# Patient Record
Sex: FEMALE | Race: WHITE | NOT HISPANIC OR LATINO | URBAN - METROPOLITAN AREA
[De-identification: names, ages, dates, MRNs, and addresses within clinical notes are randomized per-mention and may not be internally consistent; named-entity substitution may affect disease eponyms.]

---

## 2017-12-18 ENCOUNTER — OUTPATIENT (OUTPATIENT)
Dept: OUTPATIENT SERVICES | Facility: HOSPITAL | Age: 42
LOS: 1 days | End: 2017-12-18

## 2018-01-19 ENCOUNTER — OUTPATIENT (OUTPATIENT)
Dept: OUTPATIENT SERVICES | Facility: HOSPITAL | Age: 43
LOS: 1 days | End: 2018-01-19
Payer: COMMERCIAL

## 2018-01-19 PROCEDURE — 76641 ULTRASOUND BREAST COMPLETE: CPT | Mod: 26,50

## 2018-01-19 PROCEDURE — 77067 SCR MAMMO BI INCL CAD: CPT | Mod: 26

## 2018-01-19 PROCEDURE — 77067 SCR MAMMO BI INCL CAD: CPT

## 2018-01-19 PROCEDURE — 76641 ULTRASOUND BREAST COMPLETE: CPT

## 2018-09-15 ENCOUNTER — TRANSCRIPTION ENCOUNTER (OUTPATIENT)
Age: 43
End: 2018-09-15

## 2018-09-16 ENCOUNTER — EMERGENCY (EMERGENCY)
Facility: HOSPITAL | Age: 43
LOS: 1 days | Discharge: HOME | End: 2018-09-16
Attending: EMERGENCY MEDICINE
Payer: COMMERCIAL

## 2018-09-16 VITALS
TEMPERATURE: 98 F | SYSTOLIC BLOOD PRESSURE: 125 MMHG | DIASTOLIC BLOOD PRESSURE: 78 MMHG | HEART RATE: 70 BPM | RESPIRATION RATE: 16 BRPM | OXYGEN SATURATION: 98 %

## 2018-09-16 VITALS
WEIGHT: 125 LBS | SYSTOLIC BLOOD PRESSURE: 135 MMHG | HEART RATE: 65 BPM | RESPIRATION RATE: 16 BRPM | OXYGEN SATURATION: 99 % | DIASTOLIC BLOOD PRESSURE: 83 MMHG | HEIGHT: 65 IN | TEMPERATURE: 98 F

## 2018-09-16 PROCEDURE — 99283 EMERGENCY DEPT VISIT LOW MDM: CPT

## 2018-09-16 RX ORDER — OXYCODONE HYDROCHLORIDE 5 MG/1
5 TABLET ORAL ONCE
Qty: 0 | Refills: 0 | Status: DISCONTINUED | OUTPATIENT
Start: 2018-09-16 | End: 2018-09-16

## 2018-09-16 RX ORDER — IBUPROFEN 200 MG
600 TABLET ORAL ONCE
Qty: 0 | Refills: 0 | Status: COMPLETED | OUTPATIENT
Start: 2018-09-16 | End: 2018-09-16

## 2018-09-16 RX ORDER — LIDOCAINE 4 G/100G
1 CREAM TOPICAL ONCE
Qty: 0 | Refills: 0 | Status: COMPLETED | OUTPATIENT
Start: 2018-09-16 | End: 2018-09-16

## 2018-09-16 RX ORDER — OXYCODONE HYDROCHLORIDE 5 MG/1
1 TABLET ORAL
Qty: 10 | Refills: 0
Start: 2018-09-16

## 2018-09-16 RX ORDER — DIAZEPAM 5 MG
5 TABLET ORAL ONCE
Qty: 0 | Refills: 0 | Status: DISCONTINUED | OUTPATIENT
Start: 2018-09-16 | End: 2018-09-16

## 2018-09-16 RX ORDER — DIAZEPAM 5 MG
1 TABLET ORAL
Qty: 5 | Refills: 0
Start: 2018-09-16

## 2018-09-16 RX ORDER — ACETAMINOPHEN 500 MG
975 TABLET ORAL ONCE
Qty: 0 | Refills: 0 | Status: COMPLETED | OUTPATIENT
Start: 2018-09-16 | End: 2018-09-16

## 2018-09-16 RX ADMIN — Medication 600 MILLIGRAM(S): at 17:20

## 2018-09-16 RX ADMIN — OXYCODONE HYDROCHLORIDE 5 MILLIGRAM(S): 5 TABLET ORAL at 18:32

## 2018-09-16 RX ADMIN — Medication 975 MILLIGRAM(S): at 17:20

## 2018-09-16 RX ADMIN — OXYCODONE HYDROCHLORIDE 5 MILLIGRAM(S): 5 TABLET ORAL at 18:03

## 2018-09-16 RX ADMIN — Medication 5 MILLIGRAM(S): at 16:53

## 2018-09-16 RX ADMIN — Medication 975 MILLIGRAM(S): at 16:52

## 2018-09-16 RX ADMIN — OXYCODONE HYDROCHLORIDE 5 MILLIGRAM(S): 5 TABLET ORAL at 20:03

## 2018-09-16 RX ADMIN — LIDOCAINE 1 PATCH: 4 CREAM TOPICAL at 16:53

## 2018-09-16 RX ADMIN — Medication 600 MILLIGRAM(S): at 16:53

## 2018-09-16 NOTE — ED ADULT NURSE NOTE - OBJECTIVE STATEMENT
43 y/o female seen [facility] c/o low back pain 43 y/o female seen in [facility] c/o low back pain since 3days ago. Pt reports she carried her daughter and next day she woke up with low back pain. Pt's seen at Urgent Care and started on Naproxen but no relief.  Pt also had acupuncture/ cupping treatment yesterday afternoon but no improvement.  Denies fever, chills, headache, neck pain, abd pain, chest pain,  urinary or bowel problems.  Denies sensory changes or weakness to extremities.

## 2018-09-16 NOTE — ED PROVIDER NOTE - OBJECTIVE STATEMENT
43yo female pt, no significant PMHx, ambulatory c/o low back pain since 3days ago. Pt 41yo female pt, no significant PMHx, ambulatory c/o low back pain since 3days ago. Pt stated she carried her daughter and woke up with low back next day. Pt's evaluated by UC and on Napr 43yo female pt, no significant PMHx, ambulatory c/o low back pain since 3days ago. Pt stated she carried her daughter and woke up with low back next day. Pt's evaluated by UC, on Naproxyn without relief,  and 43yo female pt, no significant PMHx, ambulatory c/o low back pain since 3days ago. Pt stated she carried her daughter and woke up with low back next day. Pt's evaluated by UC yesterday and on Naproxyn without relief. She also had acupuncture/ cupping Tx yesterday afternoon but the pain's not improved. Denies fever, chills or congestion/ cough. Denies headache or neck pain. Denies CP/SOB/ABD pain or N/V/D. Denies urinary or bowel problems. Denies sensory changes or weakness to extremities.

## 2018-09-16 NOTE — ED PROVIDER NOTE - ATTENDING CONTRIBUTION TO CARE
Private Physician Lexus CARR (ny,ny/pcp)  42y female pmh Neg, no dm,htn,hld,habits,travel,cancer,mi,cva. Pt comes to ed complains of pain rt back rad to leg/ankle. Onset two days ago. Worsened yesterday. Seen at urgent care and treated with #1 naprosyn today and without improvement. No bowel./bladder changes. worse with moving from standing and sitting/changing positon. No trauma no fever. chills. PE WDWN male awake alert normocephalic atraumatic neck supple cv no rubs, gallops or murmurs, chest clear anterior & posterior abd soft +bs, Neuro +slr rt at 30degress otherwise non focal exam power 5.5 all extr pain light touch intact. No ls ttp.  Speedy Parker MD, Facep

## 2018-09-16 NOTE — ED PROVIDER NOTE - PHYSICAL EXAMINATION
NAD, VSS, Afebrile, No spinal mid tender, + Right para lumbar tender without swelling or lesion, No CVA tender, + Right sciatica tender, Neuro- intact.

## 2021-09-09 ENCOUNTER — FORM ENCOUNTER (OUTPATIENT)
Age: 46
End: 2021-09-09

## 2021-09-10 ENCOUNTER — APPOINTMENT (OUTPATIENT)
Dept: OBGYN | Facility: CLINIC | Age: 46
End: 2021-09-10
Payer: COMMERCIAL

## 2021-09-10 PROCEDURE — 0500F INITIAL PRENATAL CARE VISIT: CPT

## 2021-09-21 ENCOUNTER — APPOINTMENT (OUTPATIENT)
Dept: OBGYN | Facility: CLINIC | Age: 46
End: 2021-09-21
Payer: COMMERCIAL

## 2021-09-21 ENCOUNTER — FORM ENCOUNTER (OUTPATIENT)
Age: 46
End: 2021-09-21

## 2021-09-21 PROCEDURE — 0502F SUBSEQUENT PRENATAL CARE: CPT

## 2021-09-27 ENCOUNTER — FORM ENCOUNTER (OUTPATIENT)
Age: 46
End: 2021-09-27

## 2021-10-01 ENCOUNTER — FORM ENCOUNTER (OUTPATIENT)
Age: 46
End: 2021-10-01

## 2021-10-05 ENCOUNTER — FORM ENCOUNTER (OUTPATIENT)
Age: 46
End: 2021-10-05

## 2021-10-05 ENCOUNTER — APPOINTMENT (OUTPATIENT)
Dept: OBGYN | Facility: CLINIC | Age: 46
End: 2021-10-05
Payer: COMMERCIAL

## 2021-10-05 VITALS — SYSTOLIC BLOOD PRESSURE: 110 MMHG | DIASTOLIC BLOOD PRESSURE: 60 MMHG | WEIGHT: 140 LBS

## 2021-10-05 PROBLEM — Z00.00 ENCOUNTER FOR PREVENTIVE HEALTH EXAMINATION: Status: ACTIVE | Noted: 2021-10-05

## 2021-10-05 PROCEDURE — 0502F SUBSEQUENT PRENATAL CARE: CPT

## 2021-10-05 PROCEDURE — 76816 OB US FOLLOW-UP PER FETUS: CPT

## 2021-10-05 PROCEDURE — 76819 FETAL BIOPHYS PROFIL W/O NST: CPT

## 2021-10-27 ENCOUNTER — NON-APPOINTMENT (OUTPATIENT)
Age: 46
End: 2021-10-27

## 2021-10-27 DIAGNOSIS — Z78.9 OTHER SPECIFIED HEALTH STATUS: ICD-10-CM

## 2021-10-27 RX ORDER — PRENATAL VIT 49/IRON FUM/FOLIC 6.75-0.2MG
TABLET ORAL
Refills: 0 | Status: ACTIVE | COMMUNITY

## 2021-10-29 ENCOUNTER — APPOINTMENT (OUTPATIENT)
Dept: OBGYN | Facility: CLINIC | Age: 46
End: 2021-10-29

## 2021-11-03 ENCOUNTER — FORM ENCOUNTER (OUTPATIENT)
Age: 46
End: 2021-11-03

## 2021-11-04 ENCOUNTER — APPOINTMENT (OUTPATIENT)
Dept: OBGYN | Facility: CLINIC | Age: 46
End: 2021-11-04
Payer: COMMERCIAL

## 2021-11-04 ENCOUNTER — ASOB RESULT (OUTPATIENT)
Age: 46
End: 2021-11-04

## 2021-11-04 PROCEDURE — 0502F SUBSEQUENT PRENATAL CARE: CPT

## 2021-11-04 PROCEDURE — 76816 OB US FOLLOW-UP PER FETUS: CPT

## 2021-11-16 ENCOUNTER — APPOINTMENT (OUTPATIENT)
Dept: OBGYN | Facility: CLINIC | Age: 46
End: 2021-11-16
Payer: COMMERCIAL

## 2021-11-16 ENCOUNTER — ASOB RESULT (OUTPATIENT)
Age: 46
End: 2021-11-16

## 2021-11-16 PROCEDURE — 76818 FETAL BIOPHYS PROFILE W/NST: CPT

## 2021-11-16 PROCEDURE — 0502F SUBSEQUENT PRENATAL CARE: CPT

## 2021-11-18 LAB — B-HEM STREP SPEC QL CULT: NORMAL

## 2021-11-23 ENCOUNTER — APPOINTMENT (OUTPATIENT)
Dept: OBGYN | Facility: CLINIC | Age: 46
End: 2021-11-23
Payer: COMMERCIAL

## 2021-11-23 ENCOUNTER — ASOB RESULT (OUTPATIENT)
Age: 46
End: 2021-11-23

## 2021-11-23 PROCEDURE — 76818 FETAL BIOPHYS PROFILE W/NST: CPT

## 2021-12-01 ENCOUNTER — APPOINTMENT (OUTPATIENT)
Dept: OBGYN | Facility: CLINIC | Age: 46
End: 2021-12-01
Payer: COMMERCIAL

## 2021-12-01 ENCOUNTER — ASOB RESULT (OUTPATIENT)
Age: 46
End: 2021-12-01

## 2021-12-01 PROCEDURE — 76818 FETAL BIOPHYS PROFILE W/NST: CPT

## 2021-12-01 PROCEDURE — 76816 OB US FOLLOW-UP PER FETUS: CPT

## 2021-12-07 ENCOUNTER — APPOINTMENT (OUTPATIENT)
Dept: OBGYN | Facility: CLINIC | Age: 46
End: 2021-12-07
Payer: COMMERCIAL

## 2021-12-07 ENCOUNTER — ASOB RESULT (OUTPATIENT)
Age: 46
End: 2021-12-07

## 2021-12-07 PROCEDURE — 76818 FETAL BIOPHYS PROFILE W/NST: CPT

## 2021-12-07 PROCEDURE — 0502F SUBSEQUENT PRENATAL CARE: CPT

## 2021-12-07 PROCEDURE — 59426 ANTEPARTUM CARE ONLY: CPT

## 2021-12-10 ENCOUNTER — APPOINTMENT (OUTPATIENT)
Dept: OBGYN | Facility: CLINIC | Age: 46
End: 2021-12-10
Payer: COMMERCIAL

## 2021-12-10 PROCEDURE — 59025 FETAL NON-STRESS TEST: CPT

## 2021-12-11 ENCOUNTER — OUTPATIENT (OUTPATIENT)
Dept: OUTPATIENT SERVICES | Facility: HOSPITAL | Age: 46
LOS: 1 days | End: 2021-12-11
Payer: COMMERCIAL

## 2021-12-11 DIAGNOSIS — Z11.52 ENCOUNTER FOR SCREENING FOR COVID-19: ICD-10-CM

## 2021-12-11 LAB — SARS-COV-2 RNA SPEC QL NAA+PROBE: SIGNIFICANT CHANGE UP

## 2021-12-11 PROCEDURE — U0005: CPT

## 2021-12-11 PROCEDURE — C9803: CPT

## 2021-12-11 PROCEDURE — U0003: CPT

## 2021-12-13 ENCOUNTER — INPATIENT (INPATIENT)
Facility: HOSPITAL | Age: 46
LOS: 1 days | Discharge: HOME | End: 2021-12-15
Attending: OBSTETRICS & GYNECOLOGY | Admitting: OBSTETRICS & GYNECOLOGY
Payer: COMMERCIAL

## 2021-12-13 ENCOUNTER — OUTPATIENT (OUTPATIENT)
Dept: OUTPATIENT SERVICES | Facility: HOSPITAL | Age: 46
LOS: 1 days | End: 2021-12-13
Payer: COMMERCIAL

## 2021-12-13 VITALS
SYSTOLIC BLOOD PRESSURE: 113 MMHG | DIASTOLIC BLOOD PRESSURE: 67 MMHG | RESPIRATION RATE: 16 BRPM | HEART RATE: 78 BPM | SYSTOLIC BLOOD PRESSURE: 113 MMHG | HEART RATE: 78 BPM | DIASTOLIC BLOOD PRESSURE: 67 MMHG | TEMPERATURE: 98 F

## 2021-12-13 VITALS — HEART RATE: 79 BPM | OXYGEN SATURATION: 98 %

## 2021-12-13 DIAGNOSIS — Z34.90 ENCOUNTER FOR SUPERVISION OF NORMAL PREGNANCY, UNSPECIFIED, UNSPECIFIED TRIMESTER: ICD-10-CM

## 2021-12-13 DIAGNOSIS — O26.899 OTHER SPECIFIED PREGNANCY RELATED CONDITIONS, UNSPECIFIED TRIMESTER: ICD-10-CM

## 2021-12-13 DIAGNOSIS — Z3A.00 WEEKS OF GESTATION OF PREGNANCY NOT SPECIFIED: ICD-10-CM

## 2021-12-13 DIAGNOSIS — O09.523 SUPERVISION OF ELDERLY MULTIGRAVIDA, THIRD TRIMESTER: ICD-10-CM

## 2021-12-13 LAB
BASOPHILS # BLD AUTO: 0.06 K/UL — SIGNIFICANT CHANGE UP (ref 0–0.2)
BASOPHILS NFR BLD AUTO: 0.8 % — SIGNIFICANT CHANGE UP (ref 0–2)
BLD GP AB SCN SERPL QL: NEGATIVE — SIGNIFICANT CHANGE UP
COVID-19 SPIKE DOMAIN AB INTERP: NEGATIVE — SIGNIFICANT CHANGE UP
COVID-19 SPIKE DOMAIN ANTIBODY RESULT: 0.66 U/ML — SIGNIFICANT CHANGE UP
EOSINOPHIL # BLD AUTO: 0.09 K/UL — SIGNIFICANT CHANGE UP (ref 0–0.5)
EOSINOPHIL NFR BLD AUTO: 1.1 % — SIGNIFICANT CHANGE UP (ref 0–6)
HCT VFR BLD CALC: 33.8 % — LOW (ref 34.5–45)
HGB BLD-MCNC: 11.8 G/DL — SIGNIFICANT CHANGE UP (ref 11.5–15.5)
HIV 1+2 AB+HIV1 P24 AG SERPL QL IA: SIGNIFICANT CHANGE UP
IMM GRANULOCYTES NFR BLD AUTO: 0.3 % — SIGNIFICANT CHANGE UP (ref 0–1.5)
LYMPHOCYTES # BLD AUTO: 2.03 K/UL — SIGNIFICANT CHANGE UP (ref 1–3.3)
LYMPHOCYTES # BLD AUTO: 25.9 % — SIGNIFICANT CHANGE UP (ref 13–44)
MCHC RBC-ENTMCNC: 30.6 PG — SIGNIFICANT CHANGE UP (ref 27–34)
MCHC RBC-ENTMCNC: 34.9 GM/DL — SIGNIFICANT CHANGE UP (ref 32–36)
MCV RBC AUTO: 87.8 FL — SIGNIFICANT CHANGE UP (ref 80–100)
MONOCYTES # BLD AUTO: 0.55 K/UL — SIGNIFICANT CHANGE UP (ref 0–0.9)
MONOCYTES NFR BLD AUTO: 7 % — SIGNIFICANT CHANGE UP (ref 2–14)
NEUTROPHILS # BLD AUTO: 5.08 K/UL — SIGNIFICANT CHANGE UP (ref 1.8–7.4)
NEUTROPHILS NFR BLD AUTO: 64.9 % — SIGNIFICANT CHANGE UP (ref 43–77)
NRBC # BLD: 0 /100 WBCS — SIGNIFICANT CHANGE UP (ref 0–0)
PLATELET # BLD AUTO: 194 K/UL — SIGNIFICANT CHANGE UP (ref 150–400)
RBC # BLD: 3.85 M/UL — SIGNIFICANT CHANGE UP (ref 3.8–5.2)
RBC # FLD: 12.3 % — SIGNIFICANT CHANGE UP (ref 10.3–14.5)
RH IG SCN BLD-IMP: POSITIVE — SIGNIFICANT CHANGE UP
SARS-COV-2 IGG+IGM SERPL QL IA: 0.66 U/ML — SIGNIFICANT CHANGE UP
SARS-COV-2 IGG+IGM SERPL QL IA: NEGATIVE — SIGNIFICANT CHANGE UP
T PALLIDUM AB TITR SER: NEGATIVE — SIGNIFICANT CHANGE UP
WBC # BLD: 7.83 K/UL — SIGNIFICANT CHANGE UP (ref 3.8–10.5)
WBC # FLD AUTO: 7.83 K/UL — SIGNIFICANT CHANGE UP (ref 3.8–10.5)

## 2021-12-13 PROCEDURE — 59410 OBSTETRICAL CARE: CPT

## 2021-12-13 PROCEDURE — 86900 BLOOD TYPING SEROLOGIC ABO: CPT

## 2021-12-13 PROCEDURE — 86901 BLOOD TYPING SEROLOGIC RH(D): CPT

## 2021-12-13 PROCEDURE — G0463: CPT

## 2021-12-13 PROCEDURE — 86850 RBC ANTIBODY SCREEN: CPT

## 2021-12-13 PROCEDURE — 88307 TISSUE EXAM BY PATHOLOGIST: CPT | Mod: 26

## 2021-12-13 RX ORDER — AER TRAVELER 0.5 G/1
1 SOLUTION RECTAL; TOPICAL EVERY 4 HOURS
Refills: 0 | Status: DISCONTINUED | OUTPATIENT
Start: 2021-12-13 | End: 2021-12-15

## 2021-12-13 RX ORDER — CITRIC ACID/SODIUM CITRATE 300-500 MG
15 SOLUTION, ORAL ORAL EVERY 6 HOURS
Refills: 0 | Status: DISCONTINUED | OUTPATIENT
Start: 2021-12-13 | End: 2021-12-27

## 2021-12-13 RX ORDER — CITRIC ACID/SODIUM CITRATE 300-500 MG
15 SOLUTION, ORAL ORAL EVERY 6 HOURS
Refills: 0 | Status: DISCONTINUED | OUTPATIENT
Start: 2021-12-13 | End: 2021-12-13

## 2021-12-13 RX ORDER — OXYCODONE HYDROCHLORIDE 5 MG/1
5 TABLET ORAL ONCE
Refills: 0 | Status: DISCONTINUED | OUTPATIENT
Start: 2021-12-13 | End: 2021-12-15

## 2021-12-13 RX ORDER — SODIUM CHLORIDE 9 MG/ML
1000 INJECTION, SOLUTION INTRAVENOUS
Refills: 0 | Status: DISCONTINUED | OUTPATIENT
Start: 2021-12-13 | End: 2021-12-27

## 2021-12-13 RX ORDER — LANOLIN
1 OINTMENT (GRAM) TOPICAL EVERY 6 HOURS
Refills: 0 | Status: DISCONTINUED | OUTPATIENT
Start: 2021-12-13 | End: 2021-12-15

## 2021-12-13 RX ORDER — TETANUS TOXOID, REDUCED DIPHTHERIA TOXOID AND ACELLULAR PERTUSSIS VACCINE, ADSORBED 5; 2.5; 8; 8; 2.5 [IU]/.5ML; [IU]/.5ML; UG/.5ML; UG/.5ML; UG/.5ML
0.5 SUSPENSION INTRAMUSCULAR ONCE
Refills: 0 | Status: DISCONTINUED | OUTPATIENT
Start: 2021-12-13 | End: 2021-12-15

## 2021-12-13 RX ORDER — HYDROCORTISONE 1 %
1 OINTMENT (GRAM) TOPICAL EVERY 6 HOURS
Refills: 0 | Status: DISCONTINUED | OUTPATIENT
Start: 2021-12-13 | End: 2021-12-15

## 2021-12-13 RX ORDER — KETOROLAC TROMETHAMINE 30 MG/ML
30 SYRINGE (ML) INJECTION ONCE
Refills: 0 | Status: DISCONTINUED | OUTPATIENT
Start: 2021-12-13 | End: 2021-12-13

## 2021-12-13 RX ORDER — OXYTOCIN 10 UNIT/ML
333.33 VIAL (ML) INJECTION
Qty: 20 | Refills: 0 | Status: DISCONTINUED | OUTPATIENT
Start: 2021-12-13 | End: 2021-12-15

## 2021-12-13 RX ORDER — ACETAMINOPHEN 500 MG
975 TABLET ORAL
Refills: 0 | Status: DISCONTINUED | OUTPATIENT
Start: 2021-12-13 | End: 2021-12-15

## 2021-12-13 RX ORDER — PRAMOXINE HYDROCHLORIDE 150 MG/15G
1 AEROSOL, FOAM RECTAL EVERY 4 HOURS
Refills: 0 | Status: DISCONTINUED | OUTPATIENT
Start: 2021-12-13 | End: 2021-12-15

## 2021-12-13 RX ORDER — INFLUENZA VIRUS VACCINE 15; 15; 15; 15 UG/.5ML; UG/.5ML; UG/.5ML; UG/.5ML
0.5 SUSPENSION INTRAMUSCULAR ONCE
Refills: 0 | Status: DISCONTINUED | OUTPATIENT
Start: 2021-12-13 | End: 2021-12-15

## 2021-12-13 RX ORDER — IBUPROFEN 200 MG
600 TABLET ORAL EVERY 6 HOURS
Refills: 0 | Status: DISCONTINUED | OUTPATIENT
Start: 2021-12-13 | End: 2021-12-15

## 2021-12-13 RX ORDER — OXYTOCIN 10 UNIT/ML
333.33 VIAL (ML) INJECTION
Qty: 20 | Refills: 0 | Status: DISCONTINUED | OUTPATIENT
Start: 2021-12-13 | End: 2021-12-13

## 2021-12-13 RX ORDER — SODIUM CHLORIDE 9 MG/ML
3 INJECTION INTRAMUSCULAR; INTRAVENOUS; SUBCUTANEOUS EVERY 8 HOURS
Refills: 0 | Status: DISCONTINUED | OUTPATIENT
Start: 2021-12-13 | End: 2021-12-15

## 2021-12-13 RX ORDER — SIMETHICONE 80 MG/1
80 TABLET, CHEWABLE ORAL EVERY 4 HOURS
Refills: 0 | Status: DISCONTINUED | OUTPATIENT
Start: 2021-12-13 | End: 2021-12-15

## 2021-12-13 RX ORDER — SODIUM CHLORIDE 9 MG/ML
1000 INJECTION, SOLUTION INTRAVENOUS
Refills: 0 | Status: DISCONTINUED | OUTPATIENT
Start: 2021-12-13 | End: 2021-12-13

## 2021-12-13 RX ORDER — DIPHENHYDRAMINE HCL 50 MG
25 CAPSULE ORAL EVERY 6 HOURS
Refills: 0 | Status: DISCONTINUED | OUTPATIENT
Start: 2021-12-13 | End: 2021-12-15

## 2021-12-13 RX ORDER — IBUPROFEN 200 MG
600 TABLET ORAL EVERY 6 HOURS
Refills: 0 | Status: COMPLETED | OUTPATIENT
Start: 2021-12-13 | End: 2022-11-11

## 2021-12-13 RX ORDER — MAGNESIUM HYDROXIDE 400 MG/1
30 TABLET, CHEWABLE ORAL
Refills: 0 | Status: DISCONTINUED | OUTPATIENT
Start: 2021-12-13 | End: 2021-12-15

## 2021-12-13 RX ORDER — OXYTOCIN 10 UNIT/ML
333.33 VIAL (ML) INJECTION
Qty: 20 | Refills: 0 | Status: DISCONTINUED | OUTPATIENT
Start: 2021-12-13 | End: 2021-12-27

## 2021-12-13 RX ORDER — BENZOCAINE 10 %
1 GEL (GRAM) MUCOUS MEMBRANE EVERY 6 HOURS
Refills: 0 | Status: DISCONTINUED | OUTPATIENT
Start: 2021-12-13 | End: 2021-12-15

## 2021-12-13 RX ORDER — DIBUCAINE 1 %
1 OINTMENT (GRAM) RECTAL EVERY 6 HOURS
Refills: 0 | Status: DISCONTINUED | OUTPATIENT
Start: 2021-12-13 | End: 2021-12-15

## 2021-12-13 RX ORDER — OXYCODONE HYDROCHLORIDE 5 MG/1
5 TABLET ORAL
Refills: 0 | Status: DISCONTINUED | OUTPATIENT
Start: 2021-12-13 | End: 2021-12-15

## 2021-12-13 RX ADMIN — SODIUM CHLORIDE 125 MILLILITER(S): 9 INJECTION, SOLUTION INTRAVENOUS at 04:45

## 2021-12-13 RX ADMIN — SODIUM CHLORIDE 125 MILLILITER(S): 9 INJECTION, SOLUTION INTRAVENOUS at 05:14

## 2021-12-13 RX ADMIN — Medication 1000 MILLIUNIT(S)/MIN: at 16:19

## 2021-12-13 RX ADMIN — Medication 30 MILLIGRAM(S): at 17:52

## 2021-12-13 NOTE — OB PROVIDER LABOR PROGRESS NOTE - NS_SUBJECTIVE/OBJECTIVE_OBGYN_ALL_OB_FT
OB PA Progress Note    Pt seen and evaluated by Dr. Umana for AROM. Comfortable with epi in place.    Exam  VSS   SVE 9.5/100/0, AROM, clear fluid  EFM 120bpm, moderate variability, +accels, occasional variable/early decels  [place] Q2-3min

## 2021-12-13 NOTE — OB PROVIDER H&P - HISTORY OF PRESENT ILLNESS
46 year old  at 39.6 weeks presents for scheduled AMA IOL. -ctxs, -LOF, -VB, +FM. Uncomplicated pregnancy. -GBS.    ObHx: FT   7lbs  MedHx: denies  SurgHx: denies  GynHx: denies fibroids, cysts, abnormal paps, STIs  SocialHx: denies eTOH, tobacco, drug use  PyschHx: denies anxiety, depression, PPD  FamHx: denies  All: NKDA, NKEA, NKFA  Meds: PNV    Vital Signs Last 24 Hrs  T(C): --  T(F): --  HR: 80 (13 Dec 2021 04:23) (70 - 80)  BP: 113/67 (13 Dec 2021 04:01) (113/67 - 113/67)  BP(mean): --  RR: --  SpO2: 98% (13 Dec 2021 04:23) (98% - 99%)    PE: NAD, aOx3, abdomen soft gravid  VE: 2.0/60/-3  EFM: 120, moderate variability, -accels, -decels  [place]: no ctxs  EFW: 3400 grams  Sono: cephalic 46 year old  at 39.6 weeks presents for scheduled AMA IOL. -ctxs, -LOF, -VB, +FM. Uncomplicated pregnancy. -GBS.    ObHx: FT   7lbs  MedHx: denies  SurgHx: denies  GynHx: denies fibroids, cysts, abnormal paps, STIs  SocialHx: denies eTOH, tobacco, drug use  PyschHx: denies anxiety, depression, PPD  FamHx: denies  All: NKDA, NKEA, NKFA  Meds: PNV    Vital Signs Last 24 Hrs  T(C): --  T(F): --  HR: 80 (13 Dec 2021 04:23) (70 - 80)  BP: 113/67 (13 Dec 2021 04:01) (113/67 - 113/67)  BP(mean): --  RR: --  SpO2: 98% (13 Dec 2021 04:23) (98% - 99%)    PE: NAD, aOx3, abdomen soft gravid  VE: 2.0/60/-3  EFM: 130, moderate variability, +accels, -decels  [place]: no ctxs  EFW: 3400 grams  Sono: cephalic

## 2021-12-13 NOTE — OB PROVIDER LABOR PROGRESS NOTE - NS_SUBJECTIVE/OBJECTIVE_OBGYN_ALL_OB_FT
OB PA Progress Note    Pt seen and evaluated for potential SROM, requesting epidural.    Exam  VSS  SVE no fluid on chux, palpable bag on exam, 4-5/80/-3  EFM 120bpm, moderate variability, +accels, -decels  [place] irregular OB PA Progress Note    Pt seen and evaluated for potential SROM, requesting epidural.    Exam  VSS  SVE no fluid on chux, palpable bag on exam, 4-5/80/-3  EFM 120bpm, moderate variability, +accels, -decels  [place] irregular Q2-4min

## 2021-12-13 NOTE — OB PROVIDER H&P - NS_PHYSICALALLNEG_OBGYN_ALL_OB
Curettage Text: The wound bed was treated with curettage after the biopsy was performed. All other review of systems negative, except as noted in HPI

## 2021-12-13 NOTE — OB PROVIDER DELIVERY SUMMARY - NSSELHIDDEN_OBGYN_ALL_OB_FT
[NS_DeliveryAttending1_OBGYN_ALL_OB_FT:[id number]],[NS_DeliveryAssist1_OBGYN_ALL_OB_FT:[id number]],[NS_DeliveryAssist2_OBGYN_ALL_OB_FT:[id number]]

## 2021-12-13 NOTE — OB RN DELIVERY SUMMARY - NS_SEPSISRSKCALC_OBGYN_ALL_OB_FT
EOS calculated successfully. EOS Risk Factor: 0.5/1000 live births ([facility] national incidence); GA=39w6d; Temp=98.1; ROM=4.633; GBS='Negative'; Antibiotics='No antibiotics or any antibiotics < 2 hrs prior to birth'

## 2021-12-13 NOTE — OB PROVIDER DELIVERY SUMMARY - NSPROVIDERDELIVERYNOTE_OBGYN_ALL_OB_FT
Pt became fully and began pushing, recurrent late decelerations noted and Pediatrics called for Category II tracing. RML was performed. Spontaneous vaginal delivery of liveborn female infant from direct OP position. Head, shoulders, and body delivered easily. No nuchal. Infant was suctioned. No mec. 1 minute delayed cord clamping was performed. Cord clamped and cut and infant passed to mother. Placenta delivered intact with a 3 vessel cord. Fundal massage and intrauterine massage were given, fundus was found to be firm. Vaginal exam revealed an intact cervix, vaginal walls, and sulci. RML was repaired with 2.0 vicryl rapide suture. Excellent hemostasis was noted. Left labial 3x3cm well-circumscribed mass was noted, outside of delivery field. No intervention. Pt stated mass has been present for several years. Patient was stable and went to recovery. Count was correct x2. Pt became fully and began pushing, recurrent late decelerations noted and Pediatrics called for Category II tracing. RML was performed. Spontaneous vaginal delivery of liveborn female infant from direct OP position. Head, shoulders, and body delivered easily. No nuchal. Infant was suctioned. No mec. 1 minute delayed cord clamping was performed. Cord clamped and cut and infant passed to mother. Placenta delivered intact with a 3 vessel cord. Fundal massage and intrauterine massage were given, fundus was found to be firm. Vaginal exam revealed an intact cervix, vaginal walls, and sulci. RML was repaired with 2.0 vicryl rapide suture. Excellent hemostasis was noted. Left labial 3x3cm well-circumscribed mobile mass was noted, outside of delivery field. No intervention. Pt stated mass has been present for several years. Patient was stable and went to recovery. Count was correct x2.

## 2021-12-13 NOTE — OB NEONATOLOGY/PEDIATRICIAN DELIVERY SUMMARY - NSPEDSNEONOTESA_OBGYN_ALL_OB_FT
Requested to attend [facility] CS delivery due to Cat II tracing and vacuum assist. Mother is a 47 yo  at  39.6weeks of gestation. Prenatal labs B+, negative/NR/immune. GBS negative from . Maternal PMHx: unremarkable. Prenatal Care uncomplicated. SROM at  10:10Am ( 5 hours prior to delivery), clear fluid.  Delivery by , Vertex presentation. Emerged vigorous. Delayed cord clamping for  60 seconds. Baby given to mother. Warmed, dried, stimulated. APGAR 9/9 . Tmax 36.9'C. EOS 0.09   Mother wants to breast feed, Does not want HepB vaccine.

## 2021-12-13 NOTE — OB PROVIDER LABOR PROGRESS NOTE - ASSESSMENT
A/P:  - anesthesia called for epidural  - EFM Cat I  - Dr. Umana in house, aware    Myrtle Bird PA-C

## 2021-12-13 NOTE — OB RN DELIVERY SUMMARY - NSSELHIDDEN_OBGYN_ALL_OB_FT
[NS_DeliveryAttending1_OBGYN_ALL_OB_FT:[id number]],[NS_DeliveryAssist1_OBGYN_ALL_OB_FT:[id number]],[NS_DeliveryAssist2_OBGYN_ALL_OB_FT:[id number]],[NS_DeliveryRN_OBGYN_ALL_OB_FT:[id number]]

## 2021-12-13 NOTE — OB PROVIDER H&P - ASSESSMENT
46 year old  at 39.6 weeks for scheduled AMA IOL. Uncomplicated pregnancy. -GBS.    -Admit to L and D  -Routine labs  -NPO/IVF  -Bictra  -EFM/toco  -Anethesia consult  -PO cytotec for IOl agent  -Anticipate     Plan as per MD Umana schedule  Glenys Paz FNP-BC

## 2021-12-13 NOTE — OB PROVIDER H&P - PROBLEM SELECTOR PLAN 1
-Admit to L and D  -Routine labs  -NPO/IVF  -Bictra  -EFM/toco  -Anethesia consult  -PO cytotec for IOl agent  -Anticipate

## 2021-12-14 ENCOUNTER — APPOINTMENT (OUTPATIENT)
Dept: OBGYN | Facility: CLINIC | Age: 46
End: 2021-12-14

## 2021-12-14 PROCEDURE — 86780 TREPONEMA PALLIDUM: CPT

## 2021-12-14 PROCEDURE — 88307 TISSUE EXAM BY PATHOLOGIST: CPT

## 2021-12-14 PROCEDURE — 85025 COMPLETE CBC W/AUTO DIFF WBC: CPT

## 2021-12-14 PROCEDURE — 86769 SARS-COV-2 COVID-19 ANTIBODY: CPT

## 2021-12-14 PROCEDURE — 87389 HIV-1 AG W/HIV-1&-2 AB AG IA: CPT

## 2021-12-14 PROCEDURE — 59025 FETAL NON-STRESS TEST: CPT

## 2021-12-14 NOTE — PROGRESS NOTE ADULT - ATTENDING COMMENTS
pt seen and examined. agree with above.  PPD #1, s/p , overall doing well  VSS  FF and below umb  pain controlled with po pain meds prn.  ambulation encouraged.  baby well, breast/bottle feeding  anticipate d/c home tomorrow  h lois GUZMAN

## 2021-12-15 ENCOUNTER — TRANSCRIPTION ENCOUNTER (OUTPATIENT)
Age: 46
End: 2021-12-15

## 2021-12-15 VITALS
TEMPERATURE: 98 F | RESPIRATION RATE: 18 BRPM | DIASTOLIC BLOOD PRESSURE: 63 MMHG | SYSTOLIC BLOOD PRESSURE: 120 MMHG | HEART RATE: 65 BPM | OXYGEN SATURATION: 98 %

## 2021-12-15 DIAGNOSIS — O09.519 SUPERVISION OF ELDERLY PRIMIGRAVIDA, UNSPECIFIED TRIMESTER: ICD-10-CM

## 2021-12-15 PROBLEM — Z78.9 OTHER SPECIFIED HEALTH STATUS: Chronic | Status: ACTIVE | Noted: 2021-12-13

## 2021-12-15 RX ORDER — IBUPROFEN 200 MG
1 TABLET ORAL
Qty: 0 | Refills: 0 | DISCHARGE
Start: 2021-12-15

## 2021-12-15 RX ORDER — DIBUCAINE 1 %
1 OINTMENT (GRAM) RECTAL
Qty: 0 | Refills: 0 | DISCHARGE
Start: 2021-12-15

## 2021-12-15 RX ORDER — ACETAMINOPHEN 500 MG
2 TABLET ORAL
Qty: 112 | Refills: 0
Start: 2021-12-15 | End: 2021-12-28

## 2021-12-15 RX ORDER — BENZOCAINE 10 %
1 GEL (GRAM) MUCOUS MEMBRANE
Qty: 0 | Refills: 0 | DISCHARGE
Start: 2021-12-15

## 2021-12-15 NOTE — PROGRESS NOTE ADULT - ASSESSMENT
A/P: 47yo PPD#1 s/p .  Patient is stable and doing well post-partum.   - Pain well controlled, continue current pain regimen  - Increase ambulation, SCDs when not ambulating  - Continue regular diet    Bekah Young, PGY1
47yo P2 s/p  doing well PPD#2

## 2021-12-15 NOTE — DISCHARGE NOTE OB - PATIENT PORTAL LINK FT
You can access the FollowMyHealth Patient Portal offered by [facility] by registering at the following website: [email or web address]. By joining [facility]’s FollowMyHealth portal, you will also be able to view your health information using other applications (apps) compatible with our system.

## 2021-12-15 NOTE — DISCHARGE NOTE OB - NS MD DC FALL RISK RISK
For information on Fall & Injury Prevention, visit: [email or web address] OR  [email or web address] OR  https://www.cdc.gov/steadi/patient.html

## 2021-12-15 NOTE — DISCHARGE NOTE OB - MEDICATION SUMMARY - MEDICATIONS TO STOP TAKING
I will STOP taking the medications listed below when I get home from the hospital:    oxyCODONE 5 mg oral tablet  -- 1 tab(s) by mouth every 6 hours, As Needed -for severe pain MDD:4   -- Caution federal law prohibits the transfer of this drug to any person other  than the person for whom it was prescribed.  It is very important that you take or use this exactly as directed.  Do not skip doses or discontinue unless directed by your doctor.  May cause drowsiness.  Alcohol may intensify this effect.  Use care when operating dangerous machinery.  This prescription cannot be refilled.  Using more of this medication than prescribed may cause serious breathing problems.

## 2021-12-15 NOTE — DISCHARGE NOTE OB - CONTRAINDICATIONS & PRECAUTIONS (SELECT ALL THAT APPLY)
Patient/surrogate refused vaccine.../History of [place] syndrome within 6 weeks after a previous influenza vaccination

## 2021-12-15 NOTE — DISCHARGE NOTE OB - CARE PROVIDER_API CALL
Lisandro Umana)  Obstetrics and Gynecology  [address] [place] [address], [address]  [place] [address]  Phone: [phone]  Fax: [phone]  Follow Up Time:

## 2021-12-15 NOTE — PROGRESS NOTE ADULT - SUBJECTIVE AND OBJECTIVE BOX
PATI MAIN  MRN-[id number]  46y    Subjective:  Pt feels well, no complaints    Vital Signs Last 24 Hrs  T(C): 36.6 (15 Dec 2021 05:23), Max: 36.6 (15 Dec 2021 05:23)  T(F): 97.9 (15 Dec 2021 05:23), Max: 97.9 (15 Dec 2021 05:23)  HR: 65 (15 Dec 2021 05:23) (65 - 65)  BP: 120/63 (15 Dec 2021 05:23) (120/63 - 120/63)  BP(mean): --  RR: 18 (15 Dec 2021 05:23) (18 - 18)  SpO2: 98% (15 Dec 2021 05:23) (98% - 98%)    I&O's Summary          Allergies    No Known Allergies    Intolerances        MEDICATIONS  (STANDING):  acetaminophen     Tablet .. 975 milliGRAM(s) Oral <User Schedule>  diphtheria/tetanus/pertussis (acellular) Vaccine (ADAcel) 0.5 milliLiter(s) IntraMuscular once  ibuprofen  Tablet. 600 milliGRAM(s) Oral every 6 hours  influenza   Vaccine 0.5 milliLiter(s) IntraMuscular once  oxytocin Infusion 333.333 milliUNIT(s)/Min (1000 mL/Hr) IV Continuous <Continuous>  prenatal multivitamin 1 Tablet(s) Oral daily  sodium chloride 0.9% lock flush 3 milliLiter(s) IV Push every 8 hours    MEDICATIONS  (PRN):  benzocaine 20%/menthol 0.5% Spray 1 Spray(s) Topical every 6 hours PRN for Perineal discomfort  dibucaine 1% Ointment 1 Application(s) Topical every 6 hours PRN Perineal discomfort  diphenhydrAMINE 25 milliGRAM(s) Oral every 6 hours PRN Pruritus  hydrocortisone 1% Cream 1 Application(s) Topical every 6 hours PRN Moderate Pain (4-6)  lanolin Ointment 1 Application(s) Topical every 6 hours PRN nipple soreness  magnesium hydroxide Suspension 30 milliLiter(s) Oral two times a day PRN Constipation  oxyCODONE    IR 5 milliGRAM(s) Oral every 3 hours PRN Moderate to Severe Pain (4-10)  oxyCODONE    IR 5 milliGRAM(s) Oral once PRN Moderate to Severe Pain (4-10)  pramoxine 1%/zinc 5% Cream 1 Application(s) Topical every 4 hours PRN Moderate Pain (4-6)  simethicone 80 milliGRAM(s) Chew every 4 hours PRN Gas  witch hazel Pads 1 Application(s) Topical every 4 hours PRN Perineal discomfort                
OB Progress Note:  PPD#1    S: 45yo  PPD#1 s/p . Patient feels well. Pain is well controlled. She is tolerating a regular diet and passing flatus. She is voiding spontaneously, and ambulating without difficulty. Denies CP/SOB. Denies lightheadedness/dizziness. Denies N/V.    O:  Vitals:  Vital Signs Last 24 Hrs  T(C): 36.4 (14 Dec 2021 04:57), Max: 36.9 (13 Dec 2021 17:25)  T(F): 97.6 (14 Dec 2021 04:57), Max: 98.4 (13 Dec 2021 17:25)  HR: 65 (14 Dec 2021 04:57) (50 - 101)  BP: 101/64 (14 Dec 2021 04:57) (92/55 - 132/77)  BP(mean): 92 (13 Dec 2021 17:25) (84 - 92)  RR: 18 (14 Dec 2021 04:57) (18 - 18)  SpO2: 97% (14 Dec 2021 04:57) (73% - 100%)    MEDICATIONS  (STANDING):  acetaminophen     Tablet .. 975 milliGRAM(s) Oral <User Schedule>  diphtheria/tetanus/pertussis (acellular) Vaccine (ADAcel) 0.5 milliLiter(s) IntraMuscular once  ibuprofen  Tablet. 600 milliGRAM(s) Oral every 6 hours  influenza   Vaccine 0.5 milliLiter(s) IntraMuscular once  oxytocin Infusion 333.333 milliUNIT(s)/Min (1000 mL/Hr) IV Continuous <Continuous>  prenatal multivitamin 1 Tablet(s) Oral daily  sodium chloride 0.9% lock flush 3 milliLiter(s) IV Push every 8 hours      Labs:  Blood type: B Positive  Rubella IgG: RPR: Negative                          11.8   7.83 >-----------< 194    ( 12-13 @ 05:29 )             33.8<L>                  Physical Exam:  General: NAD  Abdomen: soft, non-tender, non-distended, fundus firm  Vaginal: Lochia wnl  Extremities: No erythema/edema

## 2021-12-15 NOTE — DISCHARGE NOTE OB - MEDICATION SUMMARY - MEDICATIONS TO TAKE
I will START or STAY ON the medications listed below when I get home from the hospital:    ibuprofen 600 mg oral tablet  -- 1 tab(s) by mouth every 6 hours  -- Indication: For moderate pain    benzocaine 20% topical spray  -- 1 spray(s) on skin every 6 hours, As needed, for Perineal discomfort  -- Indication: For perineal pain    dibucaine 1% topical ointment  -- 1 application on skin every 6 hours, As needed, Perineal discomfort  -- Indication: For perineal pain

## 2021-12-15 NOTE — DISCHARGE NOTE OB - CARE PLAN
Principal Discharge DX:	Normal vaginal delivery  Assessment and plan of treatment:	nothing in vagina (no sex, tampons, tub baths or swimming), follow up in office in 6 weeks   1

## 2021-12-21 ENCOUNTER — NON-APPOINTMENT (OUTPATIENT)
Age: 46
End: 2021-12-21

## 2022-01-26 ENCOUNTER — APPOINTMENT (OUTPATIENT)
Dept: OBGYN | Facility: CLINIC | Age: 47
End: 2022-01-26
Payer: COMMERCIAL

## 2022-01-26 VITALS
DIASTOLIC BLOOD PRESSURE: 84 MMHG | WEIGHT: 132 LBS | SYSTOLIC BLOOD PRESSURE: 130 MMHG | HEIGHT: 65 IN | BODY MASS INDEX: 21.99 KG/M2

## 2022-01-26 PROCEDURE — 0503F POSTPARTUM CARE VISIT: CPT

## 2022-01-26 NOTE — HISTORY OF PRESENT ILLNESS
[Breastfeeding] : currently nursing [BF with Difficulty] : nursing with difficulty [Postpartum Follow Up] : postpartum follow up [Delivery Date: ___] : on [unfilled] [] : delivered by vaginal delivery [Female] : Delivery History: baby girl [Wt. ___] : weighing [unfilled] [Doing Well] : is doing well [No Sign of Infection] : is showing no signs of infection [Excellent Pain Control] : has excellent pain control [Abdominal Pain] : no abdominal pain [Back Pain] : no back pain [Breast Pain] : no breast pain [BreastFeeding Problems] : no breastfeeding problems [S/Sx PP Depression] : no signs/symptoms of postpartum depression [de-identified] : Delivered by [facility] [de-identified] : breast and bottle feeding [de-identified] : L bartholin cyst noted, no signs of infection [de-identified] : 47 y/o s/p  [de-identified] : Healing well, no issues, contraception counseling, pt declines at this time; routine gyn annual in 4 months; discussed conservative vs sx mgmt of bartholin cyst, pt to decide [Back to Normal] : is still enlarged [None] : no vaginal bleeding [Normal] : the vagina was normal [Healing Well] : is healing well [Infected] : did not appear infected [Dehiscence] : was not dehisced [Cervix Sample Taken] : cervical sample not taken for a Pap smear [Examination Of The Breasts] : breasts are normal

## 2022-01-26 NOTE — END OF VISIT
[FreeTextEntry3] : I, Tari Santos, acted as a scribe on behalf of Dr. Lisandro Umana on 01/26/2022.\par \par All medical entries made by the scribe were at my, Tari Santos, direction and personally dictated by me on 01/26/2022. I have reviewed the chart and agree that the record accurately reflects my personal performance of the history, physical exam, assessment and plan. I have also personally directed, reviewed, and agreed with the chart.

## 2022-02-09 LAB — SURGICAL PATHOLOGY STUDY: SIGNIFICANT CHANGE UP

## 2023-05-27 NOTE — OB RN PATIENT PROFILE - AS [facility]
Patient : Ibeth Byers Age: 62 year old Sex: female   MRN: [id number] Encounter Date: 5/27/2023  ED Bed#: 09/09    History     Chief Complaint   Patient presents with   • Shortness of Breath     A 61 y/o female with a past medical history of HTN, CKD, heart murmur, bone and breast cancer on chemotherapy, bipolar disorder, schizoaffective disorder, and depression presents to the ED complaining of frequent vomiting and minimal PO intake for the past 3 days. Patient also reports having associated symptoms of no appetite or fluid intake, SOB, productive cough, epigastric abdominal pain, diarrhea, lightheadedness and some chest pain. She notes beginning chemotherapy 2 years ago and that she has started a \"stronger chemotherapy\" about 3 weeks ago, with her last chemotherapy session being 2 weeks ago with Dr. Davin Alvarado. Patient denies fever, hemoptysis, melena, dysuria, hematuria, leg swelling, headaches, dizziness, past PE or DVT, blood thinner use, recent surgeries, drinking EtOH, smoking cigarettes or using other drugs. Patient does smoke marijuana and currently still has the symptoms.     The history is provided by the patient and medical records.   Shortness of Breath   The current episode started 3 to 5 days ago. The onset was sudden. The problem occurs continuously. The problem has been unchanged. The problem is moderate. Nothing relieves the symptoms. Nothing aggravates the symptoms. Associated symptoms include chest pain, cough and shortness of breath. Pertinent negatives include no fever and no sore throat. There was no intake of a foreign body. She was not exposed to toxic fumes. She has not inhaled smoke recently. She has had no prior steroid use. She has had prior hospitalizations. She has had no prior ICU admissions. She has had no prior intubations. Her past medical history does not include asthma. She has been behaving normally. Urine output has been normal. There were no sick contacts. Recently, medical  care has been given by a specialist and at another facility. Services received include tests performed.     I have reviewed Ibeth Byers's previous discharge summary from 21.  Note Review Summary:   Patient admitted to the ED for right hip pain, left flank pain, other chronic paina, acute kidney injury, and hypercalcemia.    Allergies   Allergen Reactions   • Coconut   (Food Or Med) SWELLING     Pt states she is not allergic\" Coconut just doesn't agree with her\"       Current Facility-Administered Medications   Medication   • lactated ringers infusion   • HYDROcodone-acetaminophen (NORCO)  MG per tablet 1 tablet   • risperiDONE (RisperDAL) tablet 0.25 mg   • [START ON 2023] lisinopril (ZESTRIL) tablet 20 mg   • melatonin tablet 6 mg   • hydrALAZINE (APRESOLINE) injection 10 mg   • HYDROmorphone (DILAUDID) injection 0.5 mg   • sodium chloride 0.9 % flush bag 25 mL   • sodium chloride (PF) 0.9 % injection 2 mL   • Magnesium Standard Replacement Protocol   • ondansetron (ZOFRAN) injection 4 mg   • acetaminophen (TYLENOL) tablet 650 mg   • docusate sodium-sennosides (SENOKOT S) 50-8.6 MG 2 tablet   • aluminum-magnesium hydroxide-simethicone (MAALOX) 200-200-20 MG/5ML suspension 20 mL   • sodium chloride 0.9 % flush bag 25 mL   • [START ON 2023] enoxaparin (LOVENOX) injection 40 mg   • Potassium Standard Replacement Protocol (Levels 3.5 and lower)       Past Medical History:   Diagnosis Date   • Arthritis    • Bipolar affective (CMD)    • Bone cancer (CMD)    • Breast cancer (CMD) 2021    Stage IV   • Chronic kidney disease    • Depression    • Essential (primary) hypertension    • Heart murmur    • RAD (reactive airway disease)    • Schizo affective schizophrenia (CMD)        Past Surgical History:   Procedure Laterality Date   • Biopsy of breast, incisional  2021   •  section, low transverse         Family History   Problem Relation Age of Onset   • Cancer, Breast Mother    •  Cancer, Lung Mother    • Myocardial Infarction Father    • Cancer, Colon Maternal Grandmother    • Cancer Maternal Aunt    • Cancer Maternal Aunt    • Cancer, Lung Son        Social History     Tobacco Use   • Smoking status: Every Day     Current packs/day: 1.00     Average packs/day: 1 pack/day for 48.4 years (48.4 pk-yrs)     Types: Cigarettes     Start date: 1975   • Smokeless tobacco: Never   Vaping Use   • Vaping Use: never used   Substance Use Topics   • Alcohol use: Not Currently     Alcohol/week: 5.0 standard drinks of alcohol     Types: 5 Shots of liquor per week     Comment: 5-7 shots daily   • Drug use: Yes     Frequency: 7.0 times per week     Types: Marijuana     Comment: daily       Review of Systems     Review of Systems   Constitutional: Positive for appetite change. Negative for fever.   HENT: Negative for sore throat.    Eyes: Negative for visual disturbance.   Respiratory: Positive for cough and shortness of breath.    Cardiovascular: Positive for chest pain. Negative for leg swelling.   Gastrointestinal: Positive for abdominal pain, diarrhea, nausea and vomiting.   Genitourinary: Negative for difficulty urinating, dysuria and hematuria.   Musculoskeletal: Negative for back pain.   Skin: Negative for rash.   Neurological: Positive for light-headedness. Negative for dizziness and headaches.   All other systems reviewed and are negative.      Physical Exam     ED Triage Vitals   ED Triage Vitals Group      Temp 05/27/23 0854 98.2 °F (36.8 °C)      Heart Rate 05/27/23 0854 88      Resp 05/27/23 0854 20      BP 05/27/23 0854 (!) 141/94      SpO2 05/27/23 0854 97 %      EtCO2 mmHg --       Height --       Weight 05/27/23 0854 140 lb (63.5 kg)      Weight Scale Used 05/27/23 0854 ED Stated      BMI (Calculated) --       IBW/kg (Calculated) --        Physical Exam  Vitals and nursing note reviewed. Exam conducted with a chaperone present.   Constitutional:       General: She is not in acute distress.      Appearance: Normal appearance. She is well-developed. She is not toxic-appearing or diaphoretic.   HENT:      Head: Normocephalic and atraumatic.      Right Ear: External ear normal.      Left Ear: External ear normal.      Nose: Nose normal. No rhinorrhea.      Mouth/Throat:      Mouth: Mucous membranes are dry.      Pharynx: Oropharynx is clear.      Comments: Dry mucosa.      Neck: Normal, normal range of motion and neck supple. No bony tenderness.   Eyes:      Extraocular Movements: Extraocular movements intact.      Conjunctiva/sclera: Conjunctivae normal.   Cardiovascular:      Rate and Rhythm: Normal rate and regular rhythm.      Pulses: Normal pulses.           Radial pulses are 2+ on the right side and 2+ on the left side.      Heart sounds: Normal heart sounds. Heart sounds not distant. No murmur heard.  Pulmonary:      Effort: Pulmonary effort is normal. No respiratory distress.      Breath sounds: Normal breath sounds. No stridor. No decreased breath sounds, wheezing, rhonchi or rales.      Comments: Lungs are clear.   Chest:      Chest wall: No tenderness.      Comments: Right upper chest wall portacath.   Abdominal:      General: There is no distension.      Palpations: Abdomen is soft.      Tenderness: There is abdominal tenderness in the epigastric area. There is no right CVA tenderness or left CVA tenderness.      Comments: Epigastric tenderness to palpation   Musculoskeletal:         General: No deformity. Normal range of motion.      Thoracic back: Normal. No bony tenderness.      Lumbar back: Normal. No bony tenderness.      Right lower leg: No tenderness. No edema.      Left lower leg: No tenderness. No edema.      Comments: No bony tenderness to cervical, thoracic or lumbar spine.    Skin:     General: Skin is warm and dry.      Capillary Refill: Capillary refill takes less than 2 seconds.      Coloration: Skin is not cyanotic.      Findings: No rash.   Neurological:      General: No focal  deficit present.      Mental Status: She is alert. Mental status is at baseline.      Cranial Nerves: No cranial nerve deficit.      Sensory: No sensory deficit.      Motor: No weakness.   Psychiatric:         Mood and Affect: Mood and affect normal.         Speech: Speech normal.         Behavior: Behavior normal. Behavior is cooperative.         Procedures     Procedures    Lab Results     Results for orders placed or performed during the hospital encounter of 05/27/23   Comprehensive Metabolic Panel   Result Value Ref Range    Fasting Status      Sodium 137 135 - 145 mmol/L    Potassium 3.7 3.4 - 5.1 mmol/L    Chloride 102 97 - 110 mmol/L    Carbon Dioxide 23 21 - 32 mmol/L    Anion Gap 16 7 - 19 mmol/L    Glucose 113 (H) 70 - 99 mg/dL    BUN 15 6 - 20 mg/dL    Creatinine 0.72 0.51 - 0.95 mg/dL    Glomerular Filtration Rate >90 >=60    BUN/Cr 21 7 - 25    Calcium 9.3 8.4 - 10.2 mg/dL    Bilirubin, Total 0.6 0.2 - 1.0 mg/dL    GOT/AST 29 <=37 Units/L    GPT/ALT 42 <64 Units/L    Alkaline Phosphatase 218 (H) 45 - 117 Units/L    Albumin 3.4 (L) 3.6 - 5.1 g/dL    Protein, Total 7.0 6.4 - 8.2 g/dL    Globulin 3.6 2.0 - 4.0 g/dL    A/G Ratio 0.9 (L) 1.0 - 2.4   TROPONIN I, HIGH SENSITIVITY   Result Value Ref Range    Troponin I, High Sensitivity 6 <52 ng/L   Lipase   Result Value Ref Range    Lipase 178 73 - 393 Units/L   Urinalysis & Reflex Microscopy With Culture If Indicated   Result Value Ref Range    COLOR, URINALYSIS Yellow     APPEARANCE, URINALYSIS Cloudy     GLUCOSE, URINALYSIS Negative Negative mg/dL    BILIRUBIN, URINALYSIS Negative Negative    KETONES, URINALYSIS >80 (A) Negative mg/dL    SPECIFIC GRAVITY, URINALYSIS 1.028 1.005 - 1.030    OCCULT BLOOD, URINALYSIS Negative Negative    PH, URINALYSIS 6.0 5.0 - 7.0    PROTEIN, URINALYSIS 30 (A) Negative mg/dL    UROBILINOGEN, URINALYSIS 2.0 (A) 0.2, 1.0 mg/dL    NITRITE, URINALYSIS Negative Negative    LEUKOCYTE ESTERASE, URINALYSIS Negative Negative     SQUAMOUS EPITHELIAL, URINALYSIS 1 to 5 None Seen, 1 to 5 /hpf    ERYTHROCYTES, URINALYSIS 3 to 5 (A) None Seen, 1 to 2 /hpf    LEUKOCYTES, URINALYSIS 1 to 5 None Seen, 1 to 5 /hpf    BACTERIA, URINALYSIS Few (A) None Seen /hpf    HYALINE CASTS, URINALYSIS None Seen None Seen, 1 to 5 /lpf    MUCUS Present    COVID/Flu/RSV panel   Result Value Ref Range    Rapid SARS-COV-2 by PCR Not Detected Not Detected / Detected / Presumptive Positive / Inhibitors present    Influenza A by PCR Not Detected Not Detected    Influenza B by PCR Not Detected Not Detected    RSV BY PCR Not Detected Not Detected    Isolation Guidelines      Procedural Comment     CBC with Automated Differential (performable only)   Result Value Ref Range    WBC 8.0 4.2 - 11.0 K/mcL    RBC 4.27 4.00 - 5.20 mil/mcL    HGB 13.7 12.0 - 15.5 g/dL    HCT 39.2 36.0 - 46.5 %    MCV 91.8 78.0 - 100.0 fl    MCH 32.1 26.0 - 34.0 pg    MCHC 34.9 32.0 - 36.5 g/dL    RDW-CV 13.9 11.0 - 15.0 %    RDW-SD 46.9 39.0 - 50.0 fL     140 - 450 K/mcL    NRBC 0 <=0 /100 WBC    Neutrophil, Percent 56 %    Lymphocytes, Percent 37 %    Mono, Percent 6 %    Eosinophils, Percent 1 %    Basophils, Percent 0 %    Immature Granulocytes 0 %    Absolute Neutrophils 4.4 1.8 - 7.7 K/mcL    Absolute Lymphocytes 3.0 1.0 - 4.0 K/mcL    Absolute Monocytes 0.5 0.3 - 0.9 K/mcL    Absolute Eosinophils  0.1 0.0 - 0.5 K/mcL    Absolute Basophils 0.0 0.0 - 0.3 K/mcL    Absolute Immature Granulocytes 0.0 0.0 - 0.2 K/mcL   NT proBNP   Result Value Ref Range    NT-proBNP 79 <=125 pg/mL   Magnesium   Result Value Ref Range    Magnesium 1.9 1.7 - 2.4 mg/dL   TROPONIN I, HIGH SENSITIVITY   Result Value Ref Range    Troponin I, High Sensitivity 6 <52 ng/L   Prothrombin Time   Result Value Ref Range    Prothrombin Time 11.8 9.7 - 11.8 sec    INR 1.1     Partial Thromboplastin Time   Result Value Ref Range    PTT 28 22 - 30 sec   Procalcitonin   Result Value Ref Range    Procalcitonin <0.05 <=0.09  ng/mL       EKG     EKG Interpretation  Rate: 88  Rhythm: normal sinus rhythm   Abnormality: no.    EKG tracing interpreted by ED physician    Radiology Results     Imaging Results          CTA CHEST PULMONARY EMBOLISM (Final result)  Result time 05/27/23 15:15:51    Final result                 Impression:      1.   No evidence of pulmonary embolism.  2.   No evidence of bowel obstruction.  3.   Diffusely sclerotic appearance of the osseous structures consistent  with osseous metastatic disease.  There there are healing rib fractures and  a healing fracture of the right acetabulum.  4.   Splenic cyst.      FOR PHYSICIAN USE ONLY - Please note that this report was generated using  voice recognition software.  If you require clarification or feel that  there has been an error in this report please contact me through  [employer].  Thank you very much for allowing me to participate in the  care of your patient.     Electronically Signed by: HUMA HUNT M.D.   Signed on: 5/27/2023 3:15 PM   Workstation ID: [id number]             Narrative:      EXAM: CT CHEST PULMONARY ANGIOGRAM.  CT ABDOMEN AND PELVIS WITH CONTRAST.    CLINICAL INDICATION: 62-year-old female with shortness of breath, chest  pain, metastatic breast cancer, nausea, vomiting     COMPARISON:  06/30/2022    TECHNIQUE:  Axial acquisitions were obtained through the chest utilizing a  standard pulmonary embolism protocol following intravenous infusion of  contrast material.  Axial images of the abdomen and pelvis were also  obtained.  Multiplanar reformatted imaging was performed.3-D postprocessing  is performed, including MIPS imaging by technologist under direct  supervision of radiologist. Automated exposure control was used.    CONTRAST: 100 mL of Omnipaque 350 was administered intravenously.    FINDINGS:  CT chest findings:   This is a diagnostic quality CT angiogram. No filling defects are seen to  the level of the proximal subsegmental  pulmonary arteries to suggest  pulmonary embolism.    The heart is not enlarged. There is no pericardial effusion. There is no  significant supraclavicular, axillary, mediastinal, hilar, or internal  mammary lymph node enlargement.  There is mild atherosclerotic  calcification but no aneurysmal dilatation of the thoracic aorta.  There is  a right-sided port with the distal tip in the proximal superior vena cava.   There is nodularity in the right breast consistent with history of breast  cancer.    There are no pleural effusions.  There is mild pulmonary emphysema.  There  are calcifications in the right upper lobe and left lower lobe most  suggestive of scarring/prior granulomatous.  Dependent streaky basilar  airspace disease likely represents atelectasis/scarring. There is no  pneumothorax. The central tracheobronchial tree is patent.    Bone windows demonstrate multiple sclerotic lesions throughout the osseous  structures consistent with a history of metastatic breast cancer.  There is  a healing fracture of the right posterior 12th rib.  There is a healing  fracture of the left posterior 8th rib and left anterior 3rd rib.     CT abdomen and pelvis findings:  There is a 2.5 cm cyst in the superior spleen.  The liver, adrenal glands,  gallbladder, and kidneys have an unremarkable appearance.  There are  subcentimeter hypodense renal lesions too small to characterize,  statistically simple cysts as well as a 1.1 cm cyst in the lower left  kidney.    There is moderate atherosclerotic calcification but no aneurysmal  dilatation of the abdominal aorta and iliac arteries.  There are abdominal  or pelvic fluid collections.  There are no enlarged abdominal or pelvic  lymph nodes.  The uterus and ovaries are present.    Evaluation of the bowel is suboptimal without oral contrast material.   There are no dilated loops of bowel.  A normal-appearing appendix is  identified.  There is mild uncomplicated colonic  diverticulosis.    Bone windows demonstrate multiple sclerotic lesions throughout the osseous  structures consistent with a history of metastatic breast cancer.  There is  a healing fracture of the right superior acetabulum.  There is moderate  spinal canal narrowing at L3-L4 and L4-L5.                               CT ABDOMEN PELVIS W CONTRAST - IV contrast only (Final result)  Result time 05/27/23 15:15:51    Final result                 Impression:      1.   No evidence of pulmonary embolism.  2.   No evidence of bowel obstruction.  3.   Diffusely sclerotic appearance of the osseous structures consistent  with osseous metastatic disease.  There there are healing rib fractures and  a healing fracture of the right acetabulum.  4.   Splenic cyst.      FOR PHYSICIAN USE ONLY - Please note that this report was generated using  voice recognition software.  If you require clarification or feel that  there has been an error in this report please contact me through  [facility].  Thank you very much for allowing me to participate in the  care of your patient.     Electronically Signed by: HUMA HUNT M.D.   Signed on: 5/27/2023 3:15 PM   Workstation ID: [id number]             Narrative:      EXAM: CT CHEST PULMONARY ANGIOGRAM.  CT ABDOMEN AND PELVIS WITH CONTRAST.    CLINICAL INDICATION: 62-year-old female with shortness of breath, chest  pain, metastatic breast cancer, nausea, vomiting     COMPARISON:  06/30/2022    TECHNIQUE:  Axial acquisitions were obtained through the chest utilizing a  standard pulmonary embolism protocol following intravenous infusion of  contrast material.  Axial images of the abdomen and pelvis were also  obtained.  Multiplanar reformatted imaging was performed.3-D postprocessing  is performed, including MIPS imaging by technologist under direct  supervision of radiologist. Automated exposure control was used.    CONTRAST: 100 mL of Omnipaque 350 was administered  intravenously.    FINDINGS:  CT chest findings:   This is a diagnostic quality CT angiogram. No filling defects are seen to  the level of the proximal subsegmental pulmonary arteries to suggest  pulmonary embolism.    The heart is not enlarged. There is no pericardial effusion. There is no  significant supraclavicular, axillary, mediastinal, hilar, or internal  mammary lymph node enlargement.  There is mild atherosclerotic  calcification but no aneurysmal dilatation of the thoracic aorta.  There is  a right-sided port with the distal tip in the proximal superior vena cava.   There is nodularity in the right breast consistent with history of breast  cancer.    There are no pleural effusions.  There is mild pulmonary emphysema.  There  are calcifications in the right upper lobe and left lower lobe most  suggestive of scarring/prior granulomatous.  Dependent streaky basilar  airspace disease likely represents atelectasis/scarring. There is no  pneumothorax. The central tracheobronchial tree is patent.    Bone windows demonstrate multiple sclerotic lesions throughout the osseous  structures consistent with a history of metastatic breast cancer.  There is  a healing fracture of the right posterior 12th rib.  There is a healing  fracture of the left posterior 8th rib and left anterior 3rd rib.     CT abdomen and pelvis findings:  There is a 2.5 cm cyst in the superior spleen.  The liver, adrenal glands,  gallbladder, and kidneys have an unremarkable appearance.  There are  subcentimeter hypodense renal lesions too small to characterize,  statistically simple cysts as well as a 1.1 cm cyst in the lower left  kidney.    There is moderate atherosclerotic calcification but no aneurysmal  dilatation of the abdominal aorta and iliac arteries.  There are abdominal  or pelvic fluid collections.  There are no enlarged abdominal or pelvic  lymph nodes.  The uterus and ovaries are present.    Evaluation of the bowel is suboptimal  without oral contrast material.   There are no dilated loops of bowel.  A normal-appearing appendix is  identified.  There is mild uncomplicated colonic diverticulosis.    Bone windows demonstrate multiple sclerotic lesions throughout the osseous  structures consistent with a history of metastatic breast cancer.  There is  a healing fracture of the right superior acetabulum.  There is moderate  spinal canal narrowing at L3-L4 and L4-L5.                               XR CHEST PA AND LATERAL 2 VIEWS (Final result)  Result time 05/27/23 09:36:47    Final result                 Impression:      Possible opacity in left perihilar region.  Recommend dedicated CT chest  examination for further evaluation and to safely exclude underlying  infiltrate or mass.    Electronically Signed by: TOM STRICKLAND   Signed on: 5/27/2023 9:36 AM   Workstation ID: [id number]             Narrative:    PROCEDURE: CHEST, TWO VIEWS (PA and Lateral), 5/27/2023 9:09 AM    CLINICAL INDICATION: Shortness of breath.    COMPARISON: CT chest on 06/30/2022.  Chest x-ray on 05/02/2019    FINDINGS:    Right-sided Port-A-Cath is identified.    Possible opacity in the left perihilar region.  The cardiomediastinal  silhouette is unremarkable.                                 ED Medications     ED Medication Orders (From admission, onward)    Ordered Start     Status Ordering Provider    05/27/23 1554 05/27/23 1555  lactated ringers bolus 1,000 mL  ONCE         Last MAR action: New Bag SARA PAEZ N    05/27/23 1554 05/27/23 1555  ondansetron (ZOFRAN) injection 4 mg  ONCE         Last MAR action: Given SARA PAEZ N    05/27/23 1022 05/27/23 1023  lactated ringers bolus 1,000 mL  ONCE         Last MAR action: Completed SARA PAEZ N    05/27/23 1022 05/27/23 1023  morphine injection 4 mg  ONCE         Last MAR action: Given SARA PAEZ N    05/27/23 1022 05/27/23 1023  ondansetron (ZOFRAN) injection 4 mg  ONCE         Last MAR  action: Given SARA PAEZ MOOKIE          ED Course     Vitals:    05/27/23 1600 05/27/23 1700 05/27/23 1753 05/27/23 1928   BP: (!) 89/78 (!) 140/84 133/82 131/82   BP Location:   LUE - Left upper extremity LUE - Left upper extremity   Patient Position:   Sitting Supine   Pulse: 91 82 78 83   Resp: (!) 24 17 20 18   Temp: 97.9 °F (36.6 °C)  98.1 °F (36.7 °C) 98.4 °F (36.9 °C)   TempSrc: Oral  Oral Oral   SpO2: 96% 100% 100% 100%   Weight:           Independent Review Completed in ED course        Consults                ED Consults done in the ED course    MDM              MDM:   62-year-old female history of HTN, CKD, schizoaffective schizophrenia, bipolar disorder, breast cancer with metastatic disease to the bone currently on chemotherapy, sent to the ED for evaluation of shortness of breath, abdominal pain, vomiting, chest pain.  Afebrile, reassuring vitals on arrival.  On exam patient is in no respiratory distress, but dry appearing, no signs of DVT on exam, also has epigastric tenderness to palpation.  mdmge    Reviewed from triage, CBC and CMP unremarkable, initial troponin within normal limits, lipase normal.  Chest x-ray showing new opacity in the left perihilar region, right-sided Port-A-Cath in place    ECG with NSR, nonischemic.     Given patient's history of active malignancy on chemotherapy with shortness of breath and chest pain, will check CTA chest to rule out PE.  Given nausea and vomiting with poor p.o. intake and diarrhea, will also check CT abdomen pelvis.     Plan for admission with hematology on consult for dehydration, possibly secondary to vomiting and poor PO as side effect of chemotherapy. Treating with IV fluids, morphine, zofran. Final dispo pending CT results.     --    CTA chest negative for PE. CT abdomen pelvis no bowel obstruction or other acute findings to explain presentation.    Given Zofran with improvement in nausea, 1 L bolus IV fluids so far, but still having episodes of  vomiting and not able to tolerate p.o., will admit for IV rehydration and give more zofran and IVF. Obs admit to med floor w/ hemeonc on consult (Follows with Dr Davin Alvarado).     Does the Patient have sepsis: NO     ED Course as of 05/27/23 2113   Sat May 27, 2023   1033 Electrocardiogram 12-Lead  I personally reviewed and interpreted this patient's ECG which showed: Normal sinus rhythm, rate 88, no ST segment elevation or depression, right axis deviation with likely right atrial enlargement, , QRS 74, , QTc 445.   [EN]   1604 Paged hospitalist, discussed plans for admission. D/w heme onc [EN]   1605 KETONES(!): >80  C/w dehydration [EN]      ED Course User Index  [EN] Alphonse Collins MD       Critical Care     No Critical Care    Disposition     1. Dehydration        2. Chest pain, unspecified type        3. Shortness of breath        4. Nausea vomiting and diarrhea          Admit 5/27/2023  4:20 PM  Telemetry Bed?: No  Admitting Physician: SENIA SOTO [[id number]]  Is this a telephone or verbal order?: This is a telephone order from the admitting physician    Note to Patient: The 21st Century Cures Act makes medical notes like these available to patients in the interest of transparency. However, be advised this is a medical document. It is intended as peer to peer communication. It is written in medical language and may contain abbreviations or verbiage that are unfamiliar. It may appear blunt or direct. Medical documents are intended to carry relevant information, facts as evident, and the clinical opinion of the practitioner.  This note may have been transcribed using a voice dictation system. Voice recognition errors may occur. This should not be taken to alter the content or meaning of this note.    On 5/27/2023, Luis VALENTE scribed the services personally performed by Alphonse Collins MD. . The documentation recorded by the scribe accurately and completely reflects the service(s) I  personally performed and the decisions made by me.      Alphonse Collins MD  05/27/23 [id number]     (3) no apparent problem

## 2024-05-15 NOTE — OB RN PATIENT PROFILE - ALERT: PERTINENT HISTORY
finger 1st Trimester Sonogram/20 Week Level II Sonogram/BioPhysical Profile(s)/Non Invasive Prenatal Screen (NIPS)/Fetal Non-Stress Test (NST)

## 2024-06-14 NOTE — OB PROVIDER H&P - LABOR: CERVICAL POSITION
Pt with sharp pain to L thigh - radiating up to L hip x 1 week ; daughter noticed large bruise behind L knee yesterday    Pt getting retacrit injections for 2 years  
posterior

## 2024-06-27 ENCOUNTER — APPOINTMENT (OUTPATIENT)
Dept: OBGYN | Facility: CLINIC | Age: 49
End: 2024-06-27
Payer: COMMERCIAL

## 2024-06-27 VITALS
HEIGHT: 65 IN | BODY MASS INDEX: 21.66 KG/M2 | HEART RATE: 97 BPM | SYSTOLIC BLOOD PRESSURE: 128 MMHG | OXYGEN SATURATION: 99 % | DIASTOLIC BLOOD PRESSURE: 85 MMHG | WEIGHT: 130 LBS

## 2024-06-27 DIAGNOSIS — Z78.9 OTHER SPECIFIED HEALTH STATUS: ICD-10-CM

## 2024-06-27 DIAGNOSIS — R10.2 PELVIC AND PERINEAL PAIN: ICD-10-CM

## 2024-06-27 DIAGNOSIS — Z01.411 ENCOUNTER FOR GYNECOLOGICAL EXAMINATION (GENERAL) (ROUTINE) WITH ABNORMAL FINDINGS: ICD-10-CM

## 2024-06-27 DIAGNOSIS — Z80.3 FAMILY HISTORY OF MALIGNANT NEOPLASM OF BREAST: ICD-10-CM

## 2024-06-27 DIAGNOSIS — Z11.3 ENCOUNTER FOR SCREENING FOR INFECTIONS WITH A PREDOMINANTLY SEXUAL MODE OF TRANSMISSION: ICD-10-CM

## 2024-06-27 DIAGNOSIS — O09.529 SUPERVISION OF ELDERLY MULTIGRAVIDA, UNSPECIFIED TRIMESTER: ICD-10-CM

## 2024-06-27 DIAGNOSIS — Z11.51 ENCOUNTER FOR SCREENING FOR HUMAN PAPILLOMAVIRUS (HPV): ICD-10-CM

## 2024-06-27 DIAGNOSIS — Z12.39 ENCOUNTER FOR OTHER SCREENING FOR MALIGNANT NEOPLASM OF BREAST: ICD-10-CM

## 2024-06-27 DIAGNOSIS — Z12.4 ENCOUNTER FOR SCREENING FOR MALIGNANT NEOPLASM OF CERVIX: ICD-10-CM

## 2024-06-27 PROCEDURE — 81025 URINE PREGNANCY TEST: CPT

## 2024-06-27 PROCEDURE — 99396 PREV VISIT EST AGE 40-64: CPT

## 2024-06-27 PROCEDURE — 99214 OFFICE O/P EST MOD 30 MIN: CPT | Mod: 25

## 2024-06-28 ENCOUNTER — APPOINTMENT (OUTPATIENT)
Dept: ULTRASOUND IMAGING | Facility: HOSPITAL | Age: 49
End: 2024-06-28

## 2024-06-28 ENCOUNTER — APPOINTMENT (OUTPATIENT)
Dept: ULTRASOUND IMAGING | Facility: CLINIC | Age: 49
End: 2024-06-28

## 2024-06-28 LAB
C TRACH RRNA SPEC QL NAA+PROBE: NOT DETECTED
HPV HIGH+LOW RISK DNA PNL CVX: NOT DETECTED
N GONORRHOEA RRNA SPEC QL NAA+PROBE: NOT DETECTED
SOURCE TP AMPLIFICATION: NORMAL

## 2024-07-02 LAB — CYTOLOGY CVX/VAG DOC THIN PREP: NORMAL
